# Patient Record
Sex: MALE | URBAN - METROPOLITAN AREA
[De-identification: names, ages, dates, MRNs, and addresses within clinical notes are randomized per-mention and may not be internally consistent; named-entity substitution may affect disease eponyms.]

---

## 2017-02-24 ENCOUNTER — OFFICE VISIT (OUTPATIENT)
Dept: FAMILY MEDICINE | Facility: CLINIC | Age: 22
End: 2017-02-24
Payer: COMMERCIAL

## 2017-02-24 VITALS
SYSTOLIC BLOOD PRESSURE: 119 MMHG | TEMPERATURE: 96.9 F | WEIGHT: 135 LBS | DIASTOLIC BLOOD PRESSURE: 73 MMHG | HEART RATE: 64 BPM | HEIGHT: 63 IN | BODY MASS INDEX: 23.92 KG/M2 | OXYGEN SATURATION: 97 %

## 2017-02-24 DIAGNOSIS — H66.002 ACUTE SUPPURATIVE OTITIS MEDIA OF LEFT EAR WITHOUT SPONTANEOUS RUPTURE OF TYMPANIC MEMBRANE, RECURRENCE NOT SPECIFIED: ICD-10-CM

## 2017-02-24 DIAGNOSIS — H61.22 IMPACTED CERUMEN OF LEFT EAR: ICD-10-CM

## 2017-02-24 DIAGNOSIS — R07.0 THROAT PAIN: ICD-10-CM

## 2017-02-24 DIAGNOSIS — B34.9 VIRAL SYNDROME: ICD-10-CM

## 2017-02-24 DIAGNOSIS — J10.1 INFLUENZA A: Primary | ICD-10-CM

## 2017-02-24 LAB
DEPRECATED S PYO AG THROAT QL EIA: NORMAL
FLUAV+FLUBV AG SPEC QL: ABNORMAL
FLUAV+FLUBV AG SPEC QL: POSITIVE
MICRO REPORT STATUS: NORMAL
SPECIMEN SOURCE: ABNORMAL
SPECIMEN SOURCE: NORMAL

## 2017-02-24 PROCEDURE — 87880 STREP A ASSAY W/OPTIC: CPT | Performed by: FAMILY MEDICINE

## 2017-02-24 PROCEDURE — 87804 INFLUENZA ASSAY W/OPTIC: CPT | Performed by: FAMILY MEDICINE

## 2017-02-24 PROCEDURE — 87081 CULTURE SCREEN ONLY: CPT | Performed by: FAMILY MEDICINE

## 2017-02-24 PROCEDURE — 99203 OFFICE O/P NEW LOW 30 MIN: CPT | Mod: 25 | Performed by: FAMILY MEDICINE

## 2017-02-24 PROCEDURE — 69209 REMOVE IMPACTED EAR WAX UNI: CPT | Mod: LT | Performed by: FAMILY MEDICINE

## 2017-02-24 RX ORDER — OSELTAMIVIR PHOSPHATE 75 MG/1
75 CAPSULE ORAL 2 TIMES DAILY
Qty: 10 CAPSULE | Refills: 0 | Status: SHIPPED | OUTPATIENT
Start: 2017-02-24

## 2017-02-24 RX ORDER — AMOXICILLIN 500 MG/1
500 CAPSULE ORAL 3 TIMES DAILY
Qty: 30 CAPSULE | Refills: 0 | Status: SHIPPED | OUTPATIENT
Start: 2017-02-24

## 2017-02-24 NOTE — MR AVS SNAPSHOT
"              After Visit Summary   2/24/2017    Jorge Loving    MRN: [id number]           Patient Information     Date Of Birth          1995        Visit Information        Provider Department      2/24/2017 10:00 AM Kourtney Waite MD [facility]        Today's Diagnoses     Influenza A    -  1    Acute suppurative otitis media of left ear without spontaneous rupture of tympanic membrane, recurrence not specified        Throat pain        Viral syndrome        Impacted cerumen of left ear           Follow-ups after your visit        Who to contact     If you have questions or need follow up information about today's clinic visit or your schedule please contact [facility] directly at [phone].  Normal or non-critical lab and imaging results will be communicated to you by MyChart, letter or phone within 4 business days after the clinic has received the results. If you do not hear from us within 7 days, please contact the clinic through MyChart or phone. If you have a critical or abnormal lab result, we will notify you by phone as soon as possible.  Submit refill requests through [employer] or call your pharmacy and they will forward the refill request to us. Please allow 3 business days for your refill to be completed.          Additional Information About Your Visit        MyChart Information     [employer] lets you send messages to your doctor, view your test results, renew your prescriptions, schedule appointments and more. To sign up, go to [email or web address] . Click on \"Log in\" on the left side of the screen, which will take you to the Welcome page. Then click on \"Sign up Now\" on the right side of the page.     You will be asked to enter the access code listed below, as well as some personal information. Please follow the directions to create your username and password.     Your access code is: [email or web address]  Expires: 5/25/2017  5:50 PM     Your access code " "will  in 90 days. If you need help or a new code, please call your [facility] clinic or [phone].        Care EveryWhere ID     This is your Care EveryWhere ID. This could be used by other organizations to access your [facility] medical records  [id number]        Your Vitals Were     Pulse Temperature Height Pulse Oximetry BMI (Body Mass Index)       64 96.9  F (36.1  C) (Tympanic) 5' 3\" (1.6 m) 97% 23.91 kg/m2        Blood Pressure from Last 3 Encounters:   17 119/73    Weight from Last 3 Encounters:   17 135 lb (61.2 kg)              We Performed the Following     Beta strep group A culture     Influenza A/B antigen     Strep, Rapid Screen          Today's Medication Changes          These changes are accurate as of: 17  5:50 PM.  If you have any questions, ask your nurse or doctor.               Start taking these medicines.        Dose/Directions    amoxicillin 500 MG capsule   Commonly known as:  AMOXIL   Used for:  Acute suppurative otitis media of left ear without spontaneous rupture of tympanic membrane, recurrence not specified   Started by:  Kourtney Waite MD        Dose:  500 mg   Take 1 capsule (500 mg) by mouth 3 times daily   Quantity:  30 capsule   Refills:  0       oseltamivir 75 MG capsule   Commonly known as:  TAMIFLU   Used for:  Influenza A   Started by:  Kourtney Waite MD        Dose:  75 mg   Take 1 capsule (75 mg) by mouth 2 times daily   Quantity:  10 capsule   Refills:  0            Where to get your medicines      These medications were sent to [facility] - [place], [place] - [address]   [address], [place] [address]     Phone:  [phone]     amoxicillin 500 MG capsule    oseltamivir 75 MG capsule                Primary Care Provider    None Specified       No primary provider on file.        Thank you!     Thank you for choosing [facility]  for your care. Our goal is always to provide you with excellent " care. Hearing back from our patients is one way we can continue to improve our services. Please take a few minutes to complete the written survey that you may receive in the mail after your visit with us. Thank you!             Your Updated Medication List - Protect others around you: Learn how to safely use, store and throw away your medicines at www.disposemymeds.org.          This list is accurate as of: 2/24/17  5:50 PM.  Always use your most recent med list.                   Brand Name Dispense Instructions for use    amoxicillin 500 MG capsule    AMOXIL    30 capsule    Take 1 capsule (500 mg) by mouth 3 times daily       oseltamivir 75 MG capsule    TAMIFLU    10 capsule    Take 1 capsule (75 mg) by mouth 2 times daily

## 2017-02-24 NOTE — PROGRESS NOTES
"  SUBJECTIVE:                                                    Jorge Loving is a 21 year old male who presents to clinic today for the following health issues:      RESPIRATORY SYMPTOMS      Duration: Started Wednesday     Description  nasal congestion, sore throat, cough, wheezing, fever, chills, ear pain bilateral and headache    Severity: moderate    Accompanying signs and symptoms: None    History (predisposing factors):  Possible strep     Precipitating or alleviating factors: None    Therapies tried and outcome:  Tylenol and ibuprofen      Started feeling really sweaty and hot after lunch two days ago, and when he went home that night, he went to bed right away and slept all night.  He has been having left ear pain (he tried to clean it out at home, tends to get a lot of wax), sore throat, and feels very weak and tired.  He continues to have subjective fevers. He has been taking tylenol and ibuprofen for the ear pain.  His family have also developed similar symptoms.  He has had a mild runny nose.  No significant cough.  No n/v/d.  He does feel achy.    He did not have a flu shot this year.    /73 (BP Location: Right arm, Cuff Size: Adult Regular)  Pulse 64  Temp 96.9  F (36.1  C) (Tympanic)  Ht 5' 3\" (1.6 m)  Wt 135 lb (61.2 kg)  SpO2 97%  BMI 23.91 kg/m2        Problem list and histories reviewed & adjusted, as indicated.  Additional history: as documented    There is no problem list on file for this patient.    No past surgical history on file.    Social History   Substance Use Topics     Smoking status: Never Smoker     Smokeless tobacco: Not on file     Alcohol use No     Family History   Problem Relation Age of Onset     Medical History Unknown Mother      Medical History Unknown Father      Medical History Unknown Sister          No current outpatient prescriptions on file.     No Known Allergies    ROS:  Constitutional, HEENT, cardiovascular, pulmonary, gi and gu systems are negative, except as " "otherwise noted.    OBJECTIVE:                                                    /73 (BP Location: Right arm, Cuff Size: Adult Regular)  Pulse 64  Temp 96.9  F (36.1  C) (Tympanic)  Ht 5' 3\" (1.6 m)  Wt 135 lb (61.2 kg)  SpO2 97%  BMI 23.91 kg/m2  Body mass index is 23.91 kg/(m^2).  GENERAL APPEARANCE: appears a little pale and fatigued, but NAD  EYES: Eyes grossly normal to inspection, PERRL and conjunctivae and sclerae normal  HENT: pharyngeal erythema, no exudate; right ear/tm normal, left cerumen impaction.  After irrigation, the right TM is erythematous and dull.  NECK: nontender, shotty nodes in the anterior cervical chains, no asymmetry, masses, or scars and thyroid normal to palpation  RESP: lungs clear to auscultation - no rales, rhonchi or wheezes  CV: regular rates and rhythm, normal S1 S2, no S3 or S4 and no murmur, click or rub  SKIN: no suspicious lesions or rashes    Rapid strep: neg  Rapid flu: A Positive/B negative     ASSESSMENT/PLAN:                                                    1.  Influenza A: start tamiflu, recommended that other sick family members seek treatment/well members seek prophylaxis (offered to prescribe, but he said they will f/u with their own providers)  2.  Left AOM: amoxicillin    Rest, fluids, tylenol and ibuprofen as needed for fever/discomfort, f/u if any worsening cough, or any other symptoms of concern.     Kourtney Waite MD  [facility]      "

## 2017-02-24 NOTE — LETTER
[address] [address]  Phone: [phone]    February 24, 2017        Jorge Loving  [address] [place] [address]          To Whom It May Concern,    RE: Jorge Loving    Patient was seen and treated today at our clinic for influenza.  He has been advised not to return to work until he has been without fever for 24 hours, and no earlier than Monday 2/27/17.      Please contact me for questions or concerns.      Sincerely,        Kourtney Waite MD

## 2017-02-26 LAB
BACTERIA SPEC CULT: NORMAL
MICRO REPORT STATUS: NORMAL
SPECIMEN SOURCE: NORMAL

## 2017-03-31 ENCOUNTER — OFFICE VISIT (OUTPATIENT)
Dept: FAMILY MEDICINE | Facility: CLINIC | Age: 22
End: 2017-03-31
Payer: COMMERCIAL

## 2017-03-31 VITALS
WEIGHT: 139 LBS | TEMPERATURE: 97.8 F | OXYGEN SATURATION: 98 % | DIASTOLIC BLOOD PRESSURE: 70 MMHG | HEART RATE: 54 BPM | HEIGHT: 63 IN | BODY MASS INDEX: 24.63 KG/M2 | SYSTOLIC BLOOD PRESSURE: 114 MMHG | RESPIRATION RATE: 12 BRPM

## 2017-03-31 DIAGNOSIS — R04.0 EPISTAXIS: Primary | ICD-10-CM

## 2017-03-31 PROCEDURE — 99213 OFFICE O/P EST LOW 20 MIN: CPT | Performed by: FAMILY MEDICINE

## 2017-03-31 NOTE — NURSING NOTE
"Chief Complaint   Patient presents with     Nose Bleeds     Lt side daily       Initial Ht 5' 3\" (1.6 m)  Wt 139 lb (63 kg)  BMI 24.62 kg/m2 Estimated body mass index is 24.62 kg/(m^2) as calculated from the following:    Height as of this encounter: 5' 3\" (1.6 m).    Weight as of this encounter: 139 lb (63 kg).  Medication Reconciliation: complete   Gisselle White CMA      "

## 2017-03-31 NOTE — PROGRESS NOTES
"  SUBJECTIVE:                                                    Jorge Loving is a 21 year old male who presents to clinic today for the following health issues:    Consult-epistaxis ever since had influenza testing, Lt side daily    Patient presented to a  clinic in February and had nasopharyngeal flu swabbing performed.  He was positive for flu and was treated with Tamiflu.  He has been having epistaxis every time he blows his nose or sneezes since then.  He applies pressure and bleeding stops in about 2 minutes.  Bleeding only out of left nostril.      Problem list and histories reviewed & adjusted, as indicated.  Additional history: as documented    ROS:  Constitutional, HEENT, cardiovascular, pulmonary, gi and gu systems are negative, except as otherwise noted.    OBJECTIVE:                                                    /70 (BP Location: Right arm, Patient Position: Chair, Cuff Size: Adult Regular)  Pulse 54  Temp 97.8  F (36.6  C) (Oral)  Resp 12  Ht 5' 3\" (1.6 m)  Wt 139 lb (63 kg)  SpO2 98%  BMI 24.62 kg/m2  Body mass index is 24.62 kg/(m^2).  GENERAL: healthy, alert and no distress  HENT: Could not visualize area of scab or active bleeding.  Left nostril does look much more erythematous and irritated than the right.       ASSESSMENT/PLAN:                                                      1. Epistaxis  - Will refer to ENT to get scope performed to locate the area of bleeding  - May need cautery   - OTOLARYNGOLOGY REFERRAL    Follow up if symptoms are worsening or not improving    Melissa Fernandes, DO  [facility]    "

## 2017-03-31 NOTE — MR AVS SNAPSHOT
After Visit Summary   3/31/2017    Jorge Loving    MRN: [id number]           Patient Information     Date Of Birth          1995        Visit Information        Provider Department      3/31/2017 3:20 PM Melissa Fernandes,  [facility]        Today's Diagnoses     Epistaxis    -  1       Follow-ups after your visit        Additional Services     OTOLARYNGOLOGY REFERRAL       Your provider has referred you to: [facility]: [facility] [phone]   [email or web address]    Please be aware that coverage of these services is subject to the terms and limitations of your health insurance plan.  Call member services at your health plan with any benefit or coverage questions.      Please bring the following with you to your appointment:    (1) Any X-Rays, CTs or MRIs which have been performed.  Contact the facility where they were done to arrange for  prior to your scheduled appointment.   (2) List of current medications  (3) This referral request   (4) Any documents/labs given to you for this referral                  Who to contact     If you have questions or need follow up information about today's clinic visit or your schedule please contact [facility] directly at [phone].  Normal or non-critical lab and imaging results will be communicated to you by MyChart, letter or phone within 4 business days after the clinic has received the results. If you do not hear from us within 7 days, please contact the clinic through MyChart or phone. If you have a critical or abnormal lab result, we will notify you by phone as soon as possible.  Submit refill requests through [employer] or call your pharmacy and they will forward the refill request to us. Please allow 3 business days for your refill to be completed.          Additional Information About Your Visit        MyChart Information      "[employer] lets you send messages to your doctor, view your test results, renew your prescriptions, schedule appointments and more. To sign up, go to [email or web address] . Click on \"Log in\" on the left side of the screen, which will take you to the Welcome page. Then click on \"Sign up Now\" on the right side of the page.     You will be asked to enter the access code listed below, as well as some personal information. Please follow the directions to create your username and password.     Your access code is: [email or web address]  Expires: 2017  6:50 PM     Your access code will  in 90 days. If you need help or a new code, please call your [facility] clinic or [phone].        Care EveryWhere ID     This is your Care EveryWhere ID. This could be used by other organizations to access your [facility] medical records  [id number]        Your Vitals Were     Pulse Temperature Respirations Height Pulse Oximetry BMI (Body Mass Index)    54 97.8  F (36.6  C) (Oral) 12 5' 3\" (1.6 m) 98% 24.62 kg/m2       Blood Pressure from Last 3 Encounters:   17 114/70   17 119/73    Weight from Last 3 Encounters:   17 139 lb (63 kg)   17 135 lb (61.2 kg)              We Performed the Following     OTOLARYNGOLOGY REFERRAL        Primary Care Provider    None Specified       No primary provider on file.        Thank you!     Thank you for choosing [facility]  for your care. Our goal is always to provide you with excellent care. Hearing back from our patients is one way we can continue to improve our services. Please take a few minutes to complete the written survey that you may receive in the mail after your visit with us. Thank you!             Your Updated Medication List - Protect others around you: Learn how to safely use, store and throw away your medicines at www.disposemymeds.org.          This list is accurate as of: 3/31/17  4:04 PM.  Always use your most recent med list.             "       Brand Name Dispense Instructions for use    amoxicillin 500 MG capsule    AMOXIL    30 capsule    Take 1 capsule (500 mg) by mouth 3 times daily       oseltamivir 75 MG capsule    TAMIFLU    10 capsule    Take 1 capsule (75 mg) by mouth 2 times daily